# Patient Record
Sex: MALE | Race: OTHER | NOT HISPANIC OR LATINO
[De-identification: names, ages, dates, MRNs, and addresses within clinical notes are randomized per-mention and may not be internally consistent; named-entity substitution may affect disease eponyms.]

---

## 2021-02-04 ENCOUNTER — APPOINTMENT (OUTPATIENT)
Dept: PULMONOLOGY | Facility: CLINIC | Age: 61
End: 2021-02-04
Payer: COMMERCIAL

## 2021-02-04 VITALS
HEIGHT: 70 IN | SYSTOLIC BLOOD PRESSURE: 112 MMHG | WEIGHT: 192 LBS | OXYGEN SATURATION: 95 % | DIASTOLIC BLOOD PRESSURE: 73 MMHG | RESPIRATION RATE: 15 BRPM | HEART RATE: 102 BPM | TEMPERATURE: 98.4 F | BODY MASS INDEX: 27.49 KG/M2

## 2021-02-04 PROCEDURE — 99214 OFFICE O/P EST MOD 30 MIN: CPT

## 2021-02-04 PROCEDURE — 99072 ADDL SUPL MATRL&STAF TM PHE: CPT

## 2021-02-04 RX ORDER — PROMETHAZINE HYDROCHLORIDE AND DEXTROMETHORPHAN HYDROBROMIDE ORAL SOLUTION 15; 6.25 MG/5ML; MG/5ML
6.25-15 SOLUTION ORAL
Qty: 473 | Refills: 0 | Status: ACTIVE | COMMUNITY
Start: 2021-01-14

## 2021-02-04 RX ORDER — DEXAMETHASONE 6 MG/1
6 TABLET ORAL
Qty: 5 | Refills: 0 | Status: ACTIVE | COMMUNITY
Start: 2021-01-21

## 2021-02-04 RX ORDER — BENZONATATE 100 MG/1
100 CAPSULE ORAL
Qty: 60 | Refills: 0 | Status: ACTIVE | COMMUNITY
Start: 2021-01-12

## 2021-02-04 RX ORDER — ROSUVASTATIN CALCIUM 20 MG/1
20 TABLET, FILM COATED ORAL
Qty: 90 | Refills: 0 | Status: ACTIVE | COMMUNITY
Start: 2020-11-22

## 2021-02-04 RX ORDER — FLUTICASONE FUROATE, UMECLIDINIUM BROMIDE AND VILANTEROL TRIFENATATE 100; 62.5; 25 UG/1; UG/1; UG/1
100-62.5-25 POWDER RESPIRATORY (INHALATION) DAILY
Qty: 1 | Refills: 3 | Status: ACTIVE | COMMUNITY
Start: 2021-02-04 | End: 1900-01-01

## 2021-02-04 RX ORDER — MULTIVIT-MIN/FOLIC/VIT K/LYCOP 400-300MCG
25 MCG TABLET ORAL
Qty: 30 | Refills: 0 | Status: ACTIVE | COMMUNITY
Start: 2021-01-21

## 2021-02-04 RX ORDER — MULTIVIT-MIN/FOLIC/VIT K/LYCOP 400-300MCG
500 TABLET ORAL
Qty: 30 | Refills: 0 | Status: ACTIVE | COMMUNITY
Start: 2021-01-21

## 2021-02-04 RX ORDER — DEXAMETHASONE 6 MG/1
6 TABLET ORAL DAILY
Qty: 10 | Refills: 0 | Status: ACTIVE | COMMUNITY
Start: 2021-02-04 | End: 1900-01-01

## 2021-02-04 RX ORDER — METHYLPREDNISOLONE 4 MG/1
4 TABLET ORAL
Qty: 21 | Refills: 0 | Status: ACTIVE | COMMUNITY
Start: 2021-01-14

## 2021-02-04 RX ORDER — MULTIVITAMIN WITH FOLIC ACID 400 MCG
TABLET ORAL
Qty: 30 | Refills: 0 | Status: ACTIVE | COMMUNITY
Start: 2021-01-21

## 2021-02-04 RX ORDER — ZINC SULFATE 50(220)MG
220 (50 ZN) CAPSULE ORAL
Qty: 30 | Refills: 0 | Status: ACTIVE | COMMUNITY
Start: 2021-01-21

## 2021-02-04 RX ORDER — AZITHROMYCIN 250 MG/1
250 TABLET, FILM COATED ORAL
Qty: 6 | Refills: 0 | Status: ACTIVE | COMMUNITY
Start: 2021-01-14

## 2021-02-04 NOTE — ASSESSMENT
[FreeTextEntry1] : In summary the patient is a 60-year-old male with past medical history significant for high cholesterol who was recently discharged from the hospital after a bout of COVID-19 pneumonia.  The patient is still symptomatic and therefore I am restarting him on dexamethasone 6 mg daily.  I have instructed him to continue on Trelegy and to use his incentive spirometry.  I have also instructed the patient to prone as tolerated and to follow-up in 5 days.  A repeat Covid swab was ordered

## 2021-02-04 NOTE — HISTORY OF PRESENT ILLNESS
[Never] : never [TextBox_4] : Patient is a 60-year-old male with past medical history significant for high cholesterol who was recently discharged from the hospital after a bout of COVID-19 pneumonia.  The patient was treated with dexamethasone and Lovenox he presents today for further management.  The patient is complaining of cough shortness of breath and dyspnea on exertion.  He also complains of intermittent fevers

## 2021-02-04 NOTE — REASON FOR VISIT
[Follow-Up - From Hospitalization] : a follow-up visit after a recent hospitalization [Cough] : cough [Shortness of Breath] : shortness of breath [Spouse] : spouse [TextBox_44] : COVID-19 pneumonia

## 2021-02-04 NOTE — REVIEW OF SYSTEMS
[Cough] : cough [Hemoptysis] : no hemoptysis [Chest Tightness] : no chest tightness [Frequent URIs] : no frequent URIs [Sputum] : no sputum [Dyspnea] : dyspnea [Pleuritic Pain] : pleuritic pain [Wheezing] : wheezing [A.M. Dry Mouth] : a.m. dry mouth [SOB on Exertion] : sob on exertion [Negative] : Endocrine

## 2021-02-05 LAB — SARS-COV-2 N GENE NPH QL NAA+PROBE: DETECTED

## 2021-02-09 ENCOUNTER — APPOINTMENT (OUTPATIENT)
Dept: PULMONOLOGY | Facility: CLINIC | Age: 61
End: 2021-02-09
Payer: COMMERCIAL

## 2021-02-09 VITALS
OXYGEN SATURATION: 96 % | SYSTOLIC BLOOD PRESSURE: 113 MMHG | HEIGHT: 70 IN | TEMPERATURE: 98.1 F | HEART RATE: 84 BPM | DIASTOLIC BLOOD PRESSURE: 69 MMHG | RESPIRATION RATE: 14 BRPM | BODY MASS INDEX: 27.49 KG/M2 | WEIGHT: 192 LBS

## 2021-02-09 PROCEDURE — 99214 OFFICE O/P EST MOD 30 MIN: CPT

## 2021-02-09 PROCEDURE — 99072 ADDL SUPL MATRL&STAF TM PHE: CPT

## 2021-02-09 NOTE — REASON FOR VISIT
[Follow-Up] : a follow-up visit [Cough] : cough [Shortness of Breath] : shortness of breath [Wheezing] : wheezing [Spouse] : spouse [TextBox_44] : COVID 19 pneumona

## 2021-02-09 NOTE — ASSESSMENT
[FreeTextEntry1] : In summary the patient is a 60-year-old male with past medical history significant for high cholesterol status post COVID-19 pneumonia who presents for follow-up.  The patient's physical exam is significant for improved air entry bilaterally.  I have instructed the patient not to take his statin therapy for the time being.  The patient does have remnants of COVID-19 pneumonia and therefore I recommended that he continue his current current therapies and follow-up in 2 weeks.  Patient is not medically stable to return to work at this time

## 2021-02-09 NOTE — HISTORY OF PRESENT ILLNESS
[Former] : former [Never] : never [TextBox_4] : Patient is a 60-year-old male with past medical history significant for high cholesterol, history of COVID-19 pneumonia recently treated with prednisone again after discharge from the hospital who presents for follow-up.  The patient's recent nasal swab was positive post discharge.  He is currently on an oxygen concentrator.  He feels better than his last visit although he still complains of fatigue myalgias occasional cough and shortness of breath.  He denies any fevers

## 2021-02-23 ENCOUNTER — APPOINTMENT (OUTPATIENT)
Dept: PULMONOLOGY | Facility: CLINIC | Age: 61
End: 2021-02-23
Payer: COMMERCIAL

## 2021-02-23 VITALS
BODY MASS INDEX: 27.49 KG/M2 | RESPIRATION RATE: 14 BRPM | OXYGEN SATURATION: 94 % | DIASTOLIC BLOOD PRESSURE: 71 MMHG | HEIGHT: 70 IN | HEART RATE: 85 BPM | TEMPERATURE: 98.2 F | SYSTOLIC BLOOD PRESSURE: 112 MMHG | WEIGHT: 192 LBS

## 2021-02-23 PROCEDURE — 99072 ADDL SUPL MATRL&STAF TM PHE: CPT

## 2021-02-23 PROCEDURE — 99214 OFFICE O/P EST MOD 30 MIN: CPT

## 2021-02-24 NOTE — ASSESSMENT
[FreeTextEntry1] : In summary the patient is a 60-year-old male with past medical history significant for hypertension, high cholesterol who was recently discharged from the hospital after being admitted with COVID-19 pneumonia.  The patient's physical exam is significant for improved air entry.  I am concerned that the patient continues to suffer from myalgia and fatigue.  A chest x-ray has been ordered.  I am ordering a repeat Covid swab and the patient is instructed to follow-up in 1 month

## 2021-02-24 NOTE — HISTORY OF PRESENT ILLNESS
[Former] : former [Never] : never [TextBox_4] : Patient is a 60-year-old male with past medical history significant for COVID-19 pneumonia who presents today for follow-up.  The patient was treated with remdesivir and dexamethasone and anticoagulation his most recent PCR was positive and presents today for further management he continues to suffer from fatigue and myalgia.  He does complain of shortness of breath and dyspnea on exertion.  He denies any recent fevers chills chest pain weight loss or hemoptysis

## 2021-02-24 NOTE — REVIEW OF SYSTEMS
[Cough] : cough [Hemoptysis] : no hemoptysis [Frequent URIs] : no frequent URIs [Chest Tightness] : no chest tightness [Sputum] : no sputum [Dyspnea] : dyspnea [Pleuritic Pain] : pleuritic pain [Wheezing] : wheezing [SOB on Exertion] : sob on exertion [A.M. Dry Mouth] : a.m. dry mouth [Negative] : Psychiatric

## 2021-02-26 LAB — SARS-COV-2 N GENE NPH QL NAA+PROBE: NOT DETECTED

## 2021-03-23 ENCOUNTER — APPOINTMENT (OUTPATIENT)
Dept: PULMONOLOGY | Facility: CLINIC | Age: 61
End: 2021-03-23
Payer: COMMERCIAL

## 2021-03-23 VITALS
HEIGHT: 70 IN | RESPIRATION RATE: 14 BRPM | TEMPERATURE: 97.5 F | HEART RATE: 80 BPM | SYSTOLIC BLOOD PRESSURE: 137 MMHG | WEIGHT: 205 LBS | DIASTOLIC BLOOD PRESSURE: 82 MMHG | BODY MASS INDEX: 29.35 KG/M2 | OXYGEN SATURATION: 95 %

## 2021-03-23 DIAGNOSIS — U07.1 COVID-19: ICD-10-CM

## 2021-03-23 DIAGNOSIS — J12.82 COVID-19: ICD-10-CM

## 2021-03-23 DIAGNOSIS — Z00.00 ENCOUNTER FOR GENERAL ADULT MEDICAL EXAMINATION W/OUT ABNORMAL FINDINGS: ICD-10-CM

## 2021-03-23 PROCEDURE — 99072 ADDL SUPL MATRL&STAF TM PHE: CPT

## 2021-03-23 PROCEDURE — 99214 OFFICE O/P EST MOD 30 MIN: CPT

## 2021-03-23 NOTE — ASSESSMENT
[FreeTextEntry1] : In summary the patient is a 60-year-old male with past medical history significant for hypertension, high cholesterol who was recently discharged from the hospital after being admitted with COVID-19 pneumonia.  The patient's physical exam is significant for improved air entry.  I have reassured the patient and his wife that he has improved significantly since his hospitalization.  The patient is now being referred to cardiology and instructed to follow-up in 3 months

## 2021-03-23 NOTE — HISTORY OF PRESENT ILLNESS
[Former] : former [Never] : never [TextBox_4] : Patient is a 60-year-old male with past medical history significant for COVID-19 pneumonia, high cholesterol, hypertension who presents today for further management.  The patient states that his cough and shortness of breath and dyspnea on exertion have improved but not resolved completely.  He denies fevers chills chest pain weight loss or hemoptysis

## 2021-03-25 ENCOUNTER — APPOINTMENT (OUTPATIENT)
Dept: HEART AND VASCULAR | Facility: CLINIC | Age: 61
End: 2021-03-25
Payer: COMMERCIAL

## 2021-03-25 ENCOUNTER — NON-APPOINTMENT (OUTPATIENT)
Age: 61
End: 2021-03-25

## 2021-03-25 VITALS
DIASTOLIC BLOOD PRESSURE: 71 MMHG | OXYGEN SATURATION: 93 % | HEIGHT: 70 IN | RESPIRATION RATE: 15 BRPM | SYSTOLIC BLOOD PRESSURE: 123 MMHG | BODY MASS INDEX: 29.63 KG/M2 | HEART RATE: 77 BPM | TEMPERATURE: 98.1 F | WEIGHT: 207 LBS

## 2021-03-25 PROCEDURE — 99214 OFFICE O/P EST MOD 30 MIN: CPT

## 2021-03-25 PROCEDURE — 99072 ADDL SUPL MATRL&STAF TM PHE: CPT

## 2021-03-25 NOTE — PHYSICAL EXAM
[General Appearance - Well Developed] : well developed [Normal Appearance] : normal appearance [Well Groomed] : well groomed [General Appearance - Well Nourished] : well nourished [No Deformities] : no deformities [General Appearance - In No Acute Distress] : no acute distress [Normal Conjunctiva] : the conjunctiva exhibited no abnormalities [Eyelids - No Xanthelasma] : the eyelids demonstrated no xanthelasmas [Normal Oral Mucosa] : normal oral mucosa [No Oral Pallor] : no oral pallor [No Oral Cyanosis] : no oral cyanosis [Normal Jugular Venous A Waves Present] : normal jugular venous A waves present [Normal Jugular Venous V Waves Present] : normal jugular venous V waves present [No Jugular Venous Hough A Waves] : no jugular venous hough A waves [Heart Rate And Rhythm] : heart rate and rhythm were normal [Heart Sounds] : normal S1 and S2 [Murmurs] : no murmurs present [Respiration, Rhythm And Depth] : normal respiratory rhythm and effort [Exaggerated Use Of Accessory Muscles For Inspiration] : no accessory muscle use [Auscultation Breath Sounds / Voice Sounds] : lungs were clear to auscultation bilaterally [Abdomen Soft] : soft [Abdomen Tenderness] : non-tender [Abdomen Mass (___ Cm)] : no abdominal mass palpated [Nail Clubbing] : no clubbing of the fingernails [Cyanosis, Localized] : no localized cyanosis [Petechial Hemorrhages (___cm)] : no petechial hemorrhages [] : no ischemic changes

## 2021-03-30 NOTE — ASSESSMENT
[FreeTextEntry1] : 60-year-old man with past medical history of hyperlipidemia, hypertension, here for first evaluation with his provider.\par \par \par CHEST PAIN \par -in the setting of risk factors, symptoms and abn EKG will r/o ischemia with nuclear stress test\par -echo to r/o any WMA\par -aspirin 81mg daily \par -recc to restart rosuvastatin 20 mg daily (obtain documentation from PMD including recent labs)\par \par HTN\par -well controlled off meds\par \par HLD\par -obtain fasting lipid panel from PMD \par -recc to restart rosuvastatin 20 mg daily \par \par \par f/u in 2 weeks for echo and nuc stress test results \par \par \par ****ADDENDUM 3/30/2021*****\par Nuclear  stress test in 3/26/2021\par There is a medium-sized reversible perfusion defect of moderate intensity involving the mid apical inferior and inferolateral walls. In addition there is a small sized reversible perfusion defect of mild to moderate intensity involving the basal-mid anterior anteroseptal myocardial wall\par stress EKG is abnormal \par normal LV function \par \par A/P\par -In the setting of normal nuclear stress test, the patient will be scheduled for cardiac catheterization on Friday, April 9 at [facility].\par Covid-19  testing as per hospital protocol on Wednesday, April 7 in [place]\par

## 2021-04-07 ENCOUNTER — APPOINTMENT (OUTPATIENT)
Dept: HEART AND VASCULAR | Facility: CLINIC | Age: 61
End: 2021-04-07
Payer: COMMERCIAL

## 2021-04-07 PROCEDURE — ZZZZZ: CPT

## 2021-04-08 VITALS
OXYGEN SATURATION: 97 % | HEART RATE: 85 BPM | RESPIRATION RATE: 16 BRPM | HEIGHT: 62 IN | SYSTOLIC BLOOD PRESSURE: 175 MMHG | TEMPERATURE: 98 F | DIASTOLIC BLOOD PRESSURE: 82 MMHG | WEIGHT: 158.95 LBS

## 2021-04-08 RX ORDER — CHLORHEXIDINE GLUCONATE 213 G/1000ML
1 SOLUTION TOPICAL ONCE
Refills: 0 | Status: DISCONTINUED | OUTPATIENT
Start: 2021-04-09 | End: 2021-04-23

## 2021-04-08 NOTE — H&P ADULT - NSICDXFAMILYHX_GEN_ALL_CORE_FT
No pertinent family history in first degree relatives FAMILY HISTORY:  Uncle  Still living? Unknown  Family history of CVA, Age at diagnosis: Age Unknown

## 2021-04-08 NOTE — H&P ADULT - HISTORY OF PRESENT ILLNESS
Cardiologist: Dr Romero  Covid negative on 4/7/21 in [facility]  Escort: Wife  Pharmacy:    Pt will bring in meds (states only on cholesterol medication which is Rosuvastatin 20mg QHS per chart)  Dr Romero aware of intermittent episodes of BRBPR and will c/w case pending H/H on arrival. Discuss load in AM    61 y/o Male, former smoker, with FamHx of CVA (maternal uncle) and PMHx of HLD and recent Covid (hospitalized for 6 days in mid January 2021), who presented to Dr. Romero for c/o CP and MYRICK which began around the time of his Covid diagnosis. He states he becomes SOB after walking 1-2 blocks and has some epigastric pain which radiates to the center of his chest. He denies any aggravating or relieving factors. He additionally c/o 2 pillow orthopnea and finds he breaths  Pt also reports intermittent episodes of BRBPR. Pt states he was supposed to have colonoscopy done, but was unable to have it done as it was scheduled around the time of Covid diagnosis. Pt attributes these episodes to hemorrhoids, but has never been formally diagnosed by MD. Cardiologist: Dr Romero  Covid negative on 4/7/21 in [facility]  Escort: Wife  Pharmacy:    Pt will bring in meds (states only on cholesterol medication which is Rosuvastatin 20mg QHS per chart)  Dr Romero aware of intermittent episodes of BRBPR and will c/w case pending H/H on arrival. Discuss load in AM    59 y/o Male, former smoker, with FamHx of CVA (maternal uncle) and PMHx of HLD and recent Covid (hospitalized for 6 days in mid January 2021), who presented to Dr. Romero for c/o CP and MYRICK which began around the time of his Covid diagnosis. He states he becomes SOB after walking 1-2 blocks and has some epigastric pain which radiates to the center of his chest. He denies any aggravating or relieving factors. He additionally c/o 2 pillow orthopnea and finds he breaths  Pt also reports intermittent episodes of BRBPR. Pt states he was supposed to have colonoscopy done, but was unable to have it done as it was scheduled around the time of Covid diagnosis. Pt attributes these episodes to hemorrhoids, but has never been formally diagnosed by MD.  EKG on 3/25/21 per MD note revealed SR, diffuse TWI. NST on 3/26/21 revealed medium sized reversible perfusion defect of moderate intensity involving the mid apical inferior and inferolateral walls as well as a small sized reversible perfusion defect of mild-moderate intensity involving the basal-mid anterior anteroseptal wall, normal LV function.   In light of pt's risk factors, CCS class III symptoms, and abnormal NST, pt now presents to [facility] for recommended cardiac cath with possible intervention if clinically indicated.  Cardiologist: Dr Romero  Covid negative on 4/7/21 in [facility]  Escort: Wife  Pharmacy:    Dr Romero aware of intermittent episodes of BRBPR and will c/w case pending H/H on arrival.     61 y/o Male, former smoker, with FamHx of CVA (maternal uncle) and PMHx of HLD and recent Covid (hospitalized for 6 days in mid January 2021), who presented to Dr. Romero for c/o CP and MYRICK which began around the time of his Covid diagnosis. He states he becomes SOB after walking 1-2 blocks and has some epigastric pain which radiates to the center of his chest. He denies any aggravating or relieving factors. He additionally c/o 2 pillow orthopnea.  Pt also reports intermittent episodes of BRBPR, last episode a few months ago. Pt states he was supposed to have colonoscopy done, but was unable to have it done as it was scheduled around the time of Covid diagnosis. Pt attributes these episodes to hemorrhoids, but has never been formally diagnosed by MD.  EKG on 3/25/21 per MD note revealed SR, diffuse TWI. NST on 3/26/21 revealed medium sized reversible perfusion defect of moderate intensity involving the mid apical inferior and inferolateral walls as well as a small sized reversible perfusion defect of mild-moderate intensity involving the basal-mid anterior anteroseptal wall, normal LV function.   In light of pt's risk factors, CCS class III symptoms, and abnormal NST, pt now presents to [facility] for recommended cardiac cath with possible intervention if clinically indicated.

## 2021-04-08 NOTE — H&P ADULT - RS GEN PE MLT RESP DETAILS PC
respirations non-labored/clear to auscultation bilaterally/no intercostal retractions/no rales/no rhonchi/no wheezes

## 2021-04-08 NOTE — H&P ADULT - NSHPLABSRESULTS_GEN_ALL_CORE
EC.3   8.13  )-----------( 156      ( 2021 07:28 )             43.8       04-    144  |  106  |  15  ----------------------------<  106<H>  4.2   |  26  |  1.15    Ca    9.8      2021 07:28    TPro  7.6  /  Alb  4.6  /  TBili  0.5  /  DBili  x   /  AST  23  /  ALT  21  /  AlkPhos  96  04-09      PT/INR - ( 2021 07:28 )   PT: 12.5 sec;   INR: 1.04          PTT - ( 2021 07:28 )  PTT:29.9 sec    CARDIAC MARKERS ( 2021 07:28 )  x     / x     / 206 U/L / x     / 2.7 ng/mL ECG: NSR @ 73bpm, TWI in V3-V6, III, aVF                        14.3   8.13  )-----------( 156      ( 09 Apr 2021 07:28 )             43.8       04-09    144  |  106  |  15  ----------------------------<  106<H>  4.2   |  26  |  1.15    Ca    9.8      09 Apr 2021 07:28    TPro  7.6  /  Alb  4.6  /  TBili  0.5  /  DBili  x   /  AST  23  /  ALT  21  /  AlkPhos  96  04-09      PT/INR - ( 09 Apr 2021 07:28 )   PT: 12.5 sec;   INR: 1.04          PTT - ( 09 Apr 2021 07:28 )  PTT:29.9 sec    CARDIAC MARKERS ( 09 Apr 2021 07:28 )  x     / x     / 206 U/L / x     / 2.7 ng/mL

## 2021-04-09 ENCOUNTER — OUTPATIENT (OUTPATIENT)
Dept: OUTPATIENT SERVICES | Facility: HOSPITAL | Age: 61
LOS: 1 days | Discharge: HOME | End: 2021-04-09
Payer: COMMERCIAL

## 2021-04-09 DIAGNOSIS — Z98.890 OTHER SPECIFIED POSTPROCEDURAL STATES: Chronic | ICD-10-CM

## 2021-04-09 LAB
A1C WITH ESTIMATED AVERAGE GLUCOSE RESULT: 5.4 % — SIGNIFICANT CHANGE UP (ref 4–5.6)
ALBUMIN SERPL ELPH-MCNC: 4.6 G/DL — SIGNIFICANT CHANGE UP (ref 3.3–5)
ALP SERPL-CCNC: 96 U/L — SIGNIFICANT CHANGE UP (ref 40–120)
ALT FLD-CCNC: 21 U/L — SIGNIFICANT CHANGE UP (ref 10–45)
ANION GAP SERPL CALC-SCNC: 12 MMOL/L — SIGNIFICANT CHANGE UP (ref 5–17)
APTT BLD: 29.9 SEC — SIGNIFICANT CHANGE UP (ref 27.5–35.5)
AST SERPL-CCNC: 23 U/L — SIGNIFICANT CHANGE UP (ref 10–40)
BASOPHILS # BLD AUTO: 0.05 K/UL — SIGNIFICANT CHANGE UP (ref 0–0.2)
BASOPHILS NFR BLD AUTO: 0.6 % — SIGNIFICANT CHANGE UP (ref 0–2)
BILIRUB SERPL-MCNC: 0.5 MG/DL — SIGNIFICANT CHANGE UP (ref 0.2–1.2)
BUN SERPL-MCNC: 15 MG/DL — SIGNIFICANT CHANGE UP (ref 7–23)
CALCIUM SERPL-MCNC: 9.8 MG/DL — SIGNIFICANT CHANGE UP (ref 8.4–10.5)
CHLORIDE SERPL-SCNC: 106 MMOL/L — SIGNIFICANT CHANGE UP (ref 96–108)
CHOLEST SERPL-MCNC: 149 MG/DL — SIGNIFICANT CHANGE UP
CK MB CFR SERPL CALC: 2.7 NG/ML — SIGNIFICANT CHANGE UP (ref 0–6.7)
CK SERPL-CCNC: 206 U/L — HIGH (ref 30–200)
CO2 SERPL-SCNC: 26 MMOL/L — SIGNIFICANT CHANGE UP (ref 22–31)
CREAT SERPL-MCNC: 1.15 MG/DL — SIGNIFICANT CHANGE UP (ref 0.5–1.3)
EOSINOPHIL # BLD AUTO: 0.13 K/UL — SIGNIFICANT CHANGE UP (ref 0–0.5)
EOSINOPHIL NFR BLD AUTO: 1.6 % — SIGNIFICANT CHANGE UP (ref 0–6)
ESTIMATED AVERAGE GLUCOSE: 108 MG/DL — SIGNIFICANT CHANGE UP (ref 68–114)
GLUCOSE SERPL-MCNC: 106 MG/DL — HIGH (ref 70–99)
HCT VFR BLD CALC: 43.8 % — SIGNIFICANT CHANGE UP (ref 39–50)
HDLC SERPL-MCNC: 39 MG/DL — LOW
HGB BLD-MCNC: 14.3 G/DL — SIGNIFICANT CHANGE UP (ref 13–17)
IMM GRANULOCYTES NFR BLD AUTO: 0.4 % — SIGNIFICANT CHANGE UP (ref 0–1.5)
INR BLD: 1.04 — SIGNIFICANT CHANGE UP (ref 0.88–1.16)
LIPID PNL WITH DIRECT LDL SERPL: 73 MG/DL — SIGNIFICANT CHANGE UP
LYMPHOCYTES # BLD AUTO: 3.12 K/UL — SIGNIFICANT CHANGE UP (ref 1–3.3)
LYMPHOCYTES # BLD AUTO: 38.4 % — SIGNIFICANT CHANGE UP (ref 13–44)
MCHC RBC-ENTMCNC: 29.5 PG — SIGNIFICANT CHANGE UP (ref 27–34)
MCHC RBC-ENTMCNC: 32.6 GM/DL — SIGNIFICANT CHANGE UP (ref 32–36)
MCV RBC AUTO: 90.3 FL — SIGNIFICANT CHANGE UP (ref 80–100)
MONOCYTES # BLD AUTO: 0.73 K/UL — SIGNIFICANT CHANGE UP (ref 0–0.9)
MONOCYTES NFR BLD AUTO: 9 % — SIGNIFICANT CHANGE UP (ref 2–14)
NEUTROPHILS # BLD AUTO: 4.07 K/UL — SIGNIFICANT CHANGE UP (ref 1.8–7.4)
NEUTROPHILS NFR BLD AUTO: 50 % — SIGNIFICANT CHANGE UP (ref 43–77)
NON HDL CHOLESTEROL: 110 MG/DL — SIGNIFICANT CHANGE UP
NRBC # BLD: 0 /100 WBCS — SIGNIFICANT CHANGE UP (ref 0–0)
PLATELET # BLD AUTO: 156 K/UL — SIGNIFICANT CHANGE UP (ref 150–400)
POTASSIUM SERPL-MCNC: 4.2 MMOL/L — SIGNIFICANT CHANGE UP (ref 3.5–5.3)
POTASSIUM SERPL-SCNC: 4.2 MMOL/L — SIGNIFICANT CHANGE UP (ref 3.5–5.3)
PROT SERPL-MCNC: 7.6 G/DL — SIGNIFICANT CHANGE UP (ref 6–8.3)
PROTHROM AB SERPL-ACNC: 12.5 SEC — SIGNIFICANT CHANGE UP (ref 10.6–13.6)
RBC # BLD: 4.85 M/UL — SIGNIFICANT CHANGE UP (ref 4.2–5.8)
RBC # FLD: 13.7 % — SIGNIFICANT CHANGE UP (ref 10.3–14.5)
SODIUM SERPL-SCNC: 144 MMOL/L — SIGNIFICANT CHANGE UP (ref 135–145)
TRIGL SERPL-MCNC: 184 MG/DL — HIGH
WBC # BLD: 8.13 K/UL — SIGNIFICANT CHANGE UP (ref 3.8–10.5)
WBC # FLD AUTO: 8.13 K/UL — SIGNIFICANT CHANGE UP (ref 3.8–10.5)

## 2021-04-09 PROCEDURE — 99152 MOD SED SAME PHYS/QHP 5/>YRS: CPT | Mod: 59

## 2021-04-09 PROCEDURE — 93010 ELECTROCARDIOGRAM REPORT: CPT

## 2021-04-09 PROCEDURE — 99153 MOD SED SAME PHYS/QHP EA: CPT

## 2021-04-09 PROCEDURE — 99152 MOD SED SAME PHYS/QHP 5/>YRS: CPT

## 2021-04-09 PROCEDURE — C1887: CPT

## 2021-04-09 PROCEDURE — C1894: CPT

## 2021-04-09 PROCEDURE — 80053 COMPREHEN METABOLIC PANEL: CPT

## 2021-04-09 PROCEDURE — 80061 LIPID PANEL: CPT

## 2021-04-09 PROCEDURE — 93458 L HRT ARTERY/VENTRICLE ANGIO: CPT

## 2021-04-09 PROCEDURE — 36415 COLL VENOUS BLD VENIPUNCTURE: CPT

## 2021-04-09 PROCEDURE — 93005 ELECTROCARDIOGRAM TRACING: CPT

## 2021-04-09 PROCEDURE — 93458 L HRT ARTERY/VENTRICLE ANGIO: CPT | Mod: 26

## 2021-04-09 PROCEDURE — 85730 THROMBOPLASTIN TIME PARTIAL: CPT

## 2021-04-09 PROCEDURE — 83036 HEMOGLOBIN GLYCOSYLATED A1C: CPT

## 2021-04-09 PROCEDURE — 85610 PROTHROMBIN TIME: CPT

## 2021-04-09 PROCEDURE — 85025 COMPLETE CBC W/AUTO DIFF WBC: CPT

## 2021-04-09 PROCEDURE — C1769: CPT

## 2021-04-09 PROCEDURE — 82550 ASSAY OF CK (CPK): CPT

## 2021-04-09 PROCEDURE — 82553 CREATINE MB FRACTION: CPT

## 2021-04-09 RX ORDER — SODIUM CHLORIDE 9 MG/ML
500 INJECTION INTRAMUSCULAR; INTRAVENOUS; SUBCUTANEOUS
Refills: 0 | Status: DISCONTINUED | OUTPATIENT
Start: 2021-04-09 | End: 2021-04-23

## 2021-04-09 RX ORDER — SODIUM CHLORIDE 9 MG/ML
500 INJECTION INTRAMUSCULAR; INTRAVENOUS; SUBCUTANEOUS
Refills: 0 | Status: DISCONTINUED | OUTPATIENT
Start: 2021-04-09 | End: 2021-04-09

## 2021-04-09 RX ORDER — ROSUVASTATIN CALCIUM 5 MG/1
1 TABLET ORAL
Qty: 0 | Refills: 0 | DISCHARGE

## 2021-04-09 RX ORDER — CLOPIDOGREL BISULFATE 75 MG/1
600 TABLET, FILM COATED ORAL ONCE
Refills: 0 | Status: COMPLETED | OUTPATIENT
Start: 2021-04-09 | End: 2021-04-09

## 2021-04-09 RX ORDER — ASPIRIN/CALCIUM CARB/MAGNESIUM 324 MG
325 TABLET ORAL ONCE
Refills: 0 | Status: COMPLETED | OUTPATIENT
Start: 2021-04-09 | End: 2021-04-09

## 2021-04-09 RX ADMIN — Medication 325 MILLIGRAM(S): at 08:18

## 2021-04-09 RX ADMIN — SODIUM CHLORIDE 100 MILLILITER(S): 9 INJECTION INTRAMUSCULAR; INTRAVENOUS; SUBCUTANEOUS at 08:02

## 2021-04-09 RX ADMIN — CLOPIDOGREL BISULFATE 600 MILLIGRAM(S): 75 TABLET, FILM COATED ORAL at 08:18

## 2021-04-09 NOTE — PROGRESS NOTE ADULT - SUBJECTIVE AND OBJECTIVE BOX
PROCEDURE: CORONARY ANGIOGRAM, LHC, LVGRAM  INDICATION: UNSTABLE ANGINA, POSITIVE STRESS TEST   ATTENDING: DR. CRISTOFER ROMERO MD   ACCESS: RRA    FINDINGS   RCA= normal  LM= normal   LAD= luminal irregularities  Lcx= luminal irregularities  OM1=large vessel, normal    LVEF: 65%  LVEDP: mmHg     A/P  -normal coronaries   -please schedule outpatient follow up with Dr. Romero in a week in [facility] ( tel: [phone])

## 2021-04-09 NOTE — PROGRESS NOTE ADULT - SUBJECTIVE AND OBJECTIVE BOX
Interventional Cardiology PA SDA Discharge Note    Patient without complaints. Ambulated and voided without difficulties  Afebrile, VSS  Ext: Right  Radial : no hematoma,  no bleeding, dressing; C/D/  Pulses:    intact RAD to baseline     A/P:    61 y/o Male, former smoker, with FamHx of CVA (maternal uncle) and PMHx of HLD and recent Covid (hospitalized for 6 days in mid January 2021), who presented to Dr. Romero for c/o CP and MYRICK which began around the time of his Covid diagnosis. He states he becomes SOB after walking 1-2 blocks and has some epigastric pain which radiates to the center of his chest. He denies any aggravating or relieving factors. He additionally c/o 2 pillow orthopnea.  Pt also reports intermittent episodes of BRBPR, last episode a few months ago. Pt states he was supposed to have colonoscopy done, but was unable to have it done as it was scheduled around the time of Covid diagnosis. Pt attributes these episodes to hemorrhoids, but has never been formally diagnosed by MD.  EKG on 3/25/21 per MD note revealed SR, diffuse TWI. NST on 3/26/21 revealed medium sized reversible perfusion defect of moderate intensity involving the mid apical inferior and inferolateral walls as well as a small sized reversible perfusion defect of mild-moderate intensity involving the basal-mid anterior anteroseptal wall, normal LV function.   In light of pt's risk factors, CCS class III symptoms, and abnormal NST, pt now presents to [facility] for recommended cardiac cath with possible intervention if clinically indicated.   Pt is now s/p cardiac cath (4/9/21) revealing __________________. Pt to follow up with Dr. Romero on 4/13/2021 at 4:00pm.      1.	Stable for discharge as per attending Dr. Romero after bed rest, pt voids, groin/wrist stable and 30 minutes of ambulation.  2.	Follow-up with Cardiologist Dr. Romero on 4/13/2021 at 4:00pm.  3.	Discharged forms signed and copies in chart    Interventional Cardiology PA SDA Discharge Note    Patient without complaints. Ambulated and voided without difficulties  Afebrile, VSS  Ext: Right  Radial : no hematoma,  no bleeding, dressing; C/D/  Pulses:    intact RAD to baseline     A/P:    61 y/o Male, former smoker, with FamHx of CVA (maternal uncle) and PMHx of HLD and recent Covid (hospitalized for 6 days in mid January 2021), who presented to Dr. Romero for c/o CP and MYRICK which began around the time of his Covid diagnosis. He states he becomes SOB after walking 1-2 blocks and has some epigastric pain which radiates to the center of his chest. He denies any aggravating or relieving factors. He additionally c/o 2 pillow orthopnea.  Pt also reports intermittent episodes of BRBPR, last episode a few months ago. Pt states he was supposed to have colonoscopy done, but was unable to have it done as it was scheduled around the time of Covid diagnosis. Pt attributes these episodes to hemorrhoids, but has never been formally diagnosed by MD.  EKG on 3/25/21 per MD note revealed SR, diffuse TWI. NST on 3/26/21 revealed medium sized reversible perfusion defect of moderate intensity involving the mid apical inferior and inferolateral walls as well as a small sized reversible perfusion defect of mild-moderate intensity involving the basal-mid anterior anteroseptal wall, normal LV function.   In light of pt's risk factors, CCS class III symptoms, and abnormal NST, pt now presents to [facility] for recommended cardiac cath with possible intervention if clinically indicated.   Pt is now s/p cardiac cath (4/9/21) revealing normal coronaries. Pt to follow up with Dr. Romero on 4/13/2021 at 4:00pm.    FINDINGS   RCA= normal  LM= normal   LAD= luminal irregularities  Lcx= luminal irregularities  OM1=large vessel, normal    LVEF: 65%  LVEDP: mmHg    1.	Stable for discharge as per attending Dr. Romero after bed rest, pt voids, groin/wrist stable and 30 minutes of ambulation.  2.	Follow-up with Cardiologist Dr. Romero on 4/13/2021 at 4:00pm.  3.	Discharged forms signed and copies in chart

## 2021-04-10 PROBLEM — E78.5 HYPERLIPIDEMIA, UNSPECIFIED: Chronic | Status: ACTIVE | Noted: 2021-04-08

## 2021-04-10 PROBLEM — U07.1 COVID-19: Chronic | Status: ACTIVE | Noted: 2021-04-08

## 2021-04-12 DIAGNOSIS — I20.0 UNSTABLE ANGINA: ICD-10-CM

## 2021-04-12 DIAGNOSIS — R94.39 ABNORMAL RESULT OF OTHER CARDIOVASCULAR FUNCTION STUDY: ICD-10-CM

## 2021-04-12 DIAGNOSIS — E78.5 HYPERLIPIDEMIA, UNSPECIFIED: ICD-10-CM

## 2021-04-12 DIAGNOSIS — I10 ESSENTIAL (PRIMARY) HYPERTENSION: ICD-10-CM

## 2021-04-12 DIAGNOSIS — E11.9 TYPE 2 DIABETES MELLITUS WITHOUT COMPLICATIONS: ICD-10-CM

## 2021-04-13 ENCOUNTER — APPOINTMENT (OUTPATIENT)
Dept: HEART AND VASCULAR | Facility: CLINIC | Age: 61
End: 2021-04-13
Payer: COMMERCIAL

## 2021-04-13 VITALS
HEART RATE: 77 BPM | OXYGEN SATURATION: 97 % | WEIGHT: 211 LBS | RESPIRATION RATE: 14 BRPM | HEIGHT: 70 IN | DIASTOLIC BLOOD PRESSURE: 81 MMHG | SYSTOLIC BLOOD PRESSURE: 125 MMHG | TEMPERATURE: 97.4 F | BODY MASS INDEX: 30.21 KG/M2

## 2021-04-13 PROCEDURE — 99214 OFFICE O/P EST MOD 30 MIN: CPT

## 2021-04-13 PROCEDURE — 99072 ADDL SUPL MATRL&STAF TM PHE: CPT

## 2021-04-13 NOTE — HISTORY OF PRESENT ILLNESS
[FreeTextEntry1] : PMD; Dr. Lombardi\par \par 60-year-old man with past medical history of hyperlipidemia,  here for first evaluation with his provider.\par The patient was recently diagnosed with COVID- 19 pneumonia in 1/15/2021\par The patient follows up with Dr. Lombardi  (PMD on [facility])\par The patient reports MYRICK and episodes of chest pain since his diagnosis of COVID-19. . 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 Substernal, non radiating. Pleuritic \par Denies syncope, presyncope, LE edema, orthopnea. \par \par PMH\par HLD\par \par PSH\par hernia \par \par ALL\par NKDA\par \par MEDS\par rosuvastatin 20 mg daily  (not taking for two months, self d/janelle )\par \par FH\par no significant cardiac history\par \par SH\par no smoke, no etoh, no drugs\par \par \par Nuclear stress test in 3/26/2021\par There is a medium-sized reversible perfusion defect of moderate intensity involving the mid apical inferior and inferolateral walls. In addition there is a small sized reversible perfusion defect of mild to moderate intensity involving the basal-mid anterior anteroseptal myocardial wall\par stress EKG is abnormal \par normal LV function \par \par \par EKG 3/25/2021\par SR, diffuse TWI \par \par CATH 4/9/2021\par RCA= normal\par LM= normal \par LAD= luminal irregularities\par Lcx= luminal irregularities\par OM1=large vessel, normal\par LVEF: 65%\par \par \par \par

## 2021-04-13 NOTE — ASSESSMENT
[FreeTextEntry1] : 60-year-old man with past medical history of hyperlipidemia, hypertension, here for first evaluation with his provider.\par \par \par CHEST PAIN \par -minimal luminal irregularities on cath 4/2021\par -normal LVEF on LV gram and nuc \par -no echo yet\par -aspirin 81mg daily \par -cont  rosuvastatin 20 mg daily \par -recc to f/u with PMD for non cardiac etiologies of chest pain\par \par HTN\par -well controlled off meds\par -recc healthy diet and exercise\par \par HLD\par -LDL 73 on 4/9/2021\par -cont rosuvastatin 20 mg daily \par \par \par f/u in 4 months or sooner if any symptoms \par \par \par

## 2021-05-05 ENCOUNTER — APPOINTMENT (OUTPATIENT)
Dept: DISASTER EMERGENCY | Facility: OTHER | Age: 61
End: 2021-05-05
Payer: COMMERCIAL

## 2021-05-05 PROCEDURE — 0001A: CPT

## 2021-05-11 ENCOUNTER — APPOINTMENT (OUTPATIENT)
Dept: PULMONOLOGY | Facility: CLINIC | Age: 61
End: 2021-05-11
Payer: COMMERCIAL

## 2021-05-11 VITALS
TEMPERATURE: 97.3 F | BODY MASS INDEX: 30.06 KG/M2 | HEIGHT: 70 IN | WEIGHT: 210 LBS | RESPIRATION RATE: 14 BRPM | SYSTOLIC BLOOD PRESSURE: 119 MMHG | HEART RATE: 72 BPM | DIASTOLIC BLOOD PRESSURE: 71 MMHG | OXYGEN SATURATION: 97 %

## 2021-05-11 PROCEDURE — 94729 DIFFUSING CAPACITY: CPT

## 2021-05-11 PROCEDURE — 95012 NITRIC OXIDE EXP GAS DETER: CPT

## 2021-05-11 PROCEDURE — 99214 OFFICE O/P EST MOD 30 MIN: CPT | Mod: 25

## 2021-05-11 PROCEDURE — XXXXX: CPT

## 2021-05-11 PROCEDURE — 99072 ADDL SUPL MATRL&STAF TM PHE: CPT

## 2021-05-11 PROCEDURE — 94060 EVALUATION OF WHEEZING: CPT

## 2021-05-11 PROCEDURE — 94726 PLETHYSMOGRAPHY LUNG VOLUMES: CPT

## 2021-05-11 NOTE — ASSESSMENT
[FreeTextEntry1] : In summary the patient is a 60-year-old male with past medical history significant for high cholesterol status post COVID-19 pneumonia who presents today for follow-up.  The patient complains of persistent shortness of breath and dyspnea on exertion.  I reviewed the patient's negative cath report with him.  His physical exam is within normal limits.  The patient underwent a pulmonary function test which revealed normal lung volumes.\par FeNO 13 PPB\par Echo ordered to rule out pericardial effusion\par Repeat COVID swab ordered\par Patient instructed to continue current therapy and follow up in 3 months

## 2021-05-11 NOTE — HISTORY OF PRESENT ILLNESS
[TextBox_4] : Patient is a 60-year-old male with past medical history significant for high cholesterol who is status post COVID-19 pneumonia and has had a prolonged recovery who presents today for follow-up.  The patient had a recent positive nuclear stress but his cardiac catheterization was negative.  The patient's recent Covid swab was negative but he states that he was exposed to somebody who just became positive again.  He complains of shortness of breath dyspnea on exertion especially when he leans over to  packages.  The patient works as a doorman

## 2021-05-11 NOTE — REASON FOR VISIT
[Follow-Up] : a follow-up visit [Cough] : cough [Shortness of Breath] : shortness of breath [Spouse] : spouse [TextBox_44] : COVID-19 pneumonia

## 2021-05-12 LAB — SARS-COV-2 N GENE NPH QL NAA+PROBE: NOT DETECTED

## 2021-05-26 ENCOUNTER — APPOINTMENT (OUTPATIENT)
Dept: DISASTER EMERGENCY | Facility: OTHER | Age: 61
End: 2021-05-26
Payer: COMMERCIAL

## 2021-05-26 PROCEDURE — 0002A: CPT

## 2021-06-24 ENCOUNTER — NON-APPOINTMENT (OUTPATIENT)
Age: 61
End: 2021-06-24

## 2021-06-24 LAB — SARS-COV-2 N GENE NPH QL NAA+PROBE: NOT DETECTED

## 2021-09-21 ENCOUNTER — APPOINTMENT (OUTPATIENT)
Dept: HEART AND VASCULAR | Facility: CLINIC | Age: 61
End: 2021-09-21

## 2021-10-05 ENCOUNTER — APPOINTMENT (OUTPATIENT)
Dept: PULMONOLOGY | Facility: CLINIC | Age: 61
End: 2021-10-05
Payer: COMMERCIAL

## 2021-10-05 VITALS
DIASTOLIC BLOOD PRESSURE: 73 MMHG | WEIGHT: 200 LBS | BODY MASS INDEX: 28.63 KG/M2 | HEART RATE: 57 BPM | RESPIRATION RATE: 14 BRPM | OXYGEN SATURATION: 98 % | HEIGHT: 70 IN | TEMPERATURE: 98.4 F | SYSTOLIC BLOOD PRESSURE: 118 MMHG

## 2021-10-05 DIAGNOSIS — R06.02 SHORTNESS OF BREATH: ICD-10-CM

## 2021-10-05 DIAGNOSIS — E78.00 PURE HYPERCHOLESTEROLEMIA, UNSPECIFIED: ICD-10-CM

## 2021-10-05 DIAGNOSIS — Z78.9 OTHER SPECIFIED HEALTH STATUS: ICD-10-CM

## 2021-10-05 DIAGNOSIS — U09.9 POST COVID-19 CONDITION, UNSPECIFIED: ICD-10-CM

## 2021-10-05 PROCEDURE — 99214 OFFICE O/P EST MOD 30 MIN: CPT

## 2021-10-05 RX ORDER — ALBUTEROL SULFATE 90 UG/1
108 (90 BASE) INHALANT RESPIRATORY (INHALATION) EVERY 6 HOURS
Qty: 1 | Refills: 6 | Status: ACTIVE | COMMUNITY
Start: 2021-10-05 | End: 1900-01-01

## 2021-10-06 PROBLEM — Z78.9 SOCIAL ALCOHOL USE: Status: ACTIVE | Noted: 2021-02-04

## 2021-10-06 PROBLEM — Z78.9 NON-SMOKER: Status: ACTIVE | Noted: 2021-02-04

## 2021-10-06 PROBLEM — E78.00 HIGH CHOLESTEROL: Status: ACTIVE | Noted: 2021-02-04

## 2021-10-06 PROBLEM — U07.1 PNEUMONIA DUE TO COVID-19 VIRUS: Status: ACTIVE | Noted: 2021-02-04

## 2021-10-06 NOTE — REASON FOR VISIT
[Follow-Up] : a follow-up visit [Cough] : cough [Shortness of Breath] : shortness of breath [TextBox_44] : Post COVID-19 pneumonia

## 2021-10-06 NOTE — ASSESSMENT
[FreeTextEntry1] : Summary the patient is a 61-year-old male with hypertension, anxiety who is status post COVID-19 pneumonia who presents today for follow-up.  The patient's physical exam is significant for improved air entry bilaterally.  Patient is instructed to use his rescue inhaler on a as needed basis and to follow-up in 3 months.  Prescription renewal performed

## 2021-10-06 NOTE — REVIEW OF SYSTEMS
[Cough] : cough [Hemoptysis] : no hemoptysis [Chest Tightness] : no chest tightness [Frequent URIs] : no frequent URIs [Sputum] : no sputum [Dyspnea] : dyspnea [Pleuritic Pain] : no pleuritic pain [Wheezing] : no wheezing [A.M. Dry Mouth] : a.m. dry mouth [SOB on Exertion] : sob on exertion [Negative] : Endocrine

## 2021-10-06 NOTE — HISTORY OF PRESENT ILLNESS
[Former] : former [Never] : never [TextBox_4] : Patient is a 61-year-old male with past medical history significant for high cholesterol status post hospitalization for COVID-19 pneumonia who presents today for follow-up.  Patient has improved significantly since his discharge from the hospital.  He currently states that he occasionally suffers from cough and shortness of breath but not as before.  He denies fevers chills chest pain weight loss or hemoptysis

## 2021-10-19 ENCOUNTER — NON-APPOINTMENT (OUTPATIENT)
Age: 61
End: 2021-10-19

## 2021-10-19 ENCOUNTER — APPOINTMENT (OUTPATIENT)
Dept: HEART AND VASCULAR | Facility: CLINIC | Age: 61
End: 2021-10-19
Payer: COMMERCIAL

## 2021-10-19 VITALS
DIASTOLIC BLOOD PRESSURE: 71 MMHG | TEMPERATURE: 97.9 F | BODY MASS INDEX: 28.35 KG/M2 | HEART RATE: 54 BPM | RESPIRATION RATE: 15 BRPM | SYSTOLIC BLOOD PRESSURE: 112 MMHG | OXYGEN SATURATION: 97 % | HEIGHT: 70 IN | WEIGHT: 198 LBS

## 2021-10-19 PROCEDURE — 99214 OFFICE O/P EST MOD 30 MIN: CPT

## 2021-10-19 NOTE — PHYSICAL EXAM
[General Appearance - Well Developed] : well developed [Normal Appearance] : normal appearance [Well Groomed] : well groomed [General Appearance - Well Nourished] : well nourished [No Deformities] : no deformities [General Appearance - In No Acute Distress] : no acute distress [Eyelids - No Xanthelasma] : the eyelids demonstrated no xanthelasmas [Normal Oral Mucosa] : normal oral mucosa [No Oral Pallor] : no oral pallor [No Oral Cyanosis] : no oral cyanosis [Normal Jugular Venous A Waves Present] : normal jugular venous A waves present [Normal Jugular Venous V Waves Present] : normal jugular venous V waves present [No Jugular Venous Hough A Waves] : no jugular venous hough A waves [Heart Rate And Rhythm] : heart rate and rhythm were normal [Heart Sounds] : normal S1 and S2 [Murmurs] : no murmurs present [Respiration, Rhythm And Depth] : normal respiratory rhythm and effort [Exaggerated Use Of Accessory Muscles For Inspiration] : no accessory muscle use [Auscultation Breath Sounds / Voice Sounds] : lungs were clear to auscultation bilaterally [Abdomen Soft] : soft [Abdomen Tenderness] : non-tender [Abdomen Mass (___ Cm)] : no abdominal mass palpated [Nail Clubbing] : no clubbing of the fingernails [Cyanosis, Localized] : no localized cyanosis [Petechial Hemorrhages (___cm)] : no petechial hemorrhages [] : no ischemic changes [Well Developed] : well developed [Well Nourished] : well nourished [No Acute Distress] : no acute distress [Normal Conjunctiva] : normal conjunctiva [Normal Venous Pressure] : normal venous pressure [No Carotid Bruit] : no carotid bruit [Normal S1, S2] : normal S1, S2 [No Murmur] : no murmur [No Rub] : no rub [No Gallop] : no gallop [Clear Lung Fields] : clear lung fields [Good Air Entry] : good air entry [No Respiratory Distress] : no respiratory distress  [Soft] : abdomen soft [Non Tender] : non-tender [No Masses/organomegaly] : no masses/organomegaly [Normal Bowel Sounds] : normal bowel sounds [Normal Gait] : normal gait [No Edema] : no edema [No Cyanosis] : no cyanosis [No Clubbing] : no clubbing [No Varicosities] : no varicosities [No Rash] : no rash [No Skin Lesions] : no skin lesions [Moves all extremities] : moves all extremities [No Focal Deficits] : no focal deficits [Normal Speech] : normal speech [Alert and Oriented] : alert and oriented [Normal memory] : normal memory

## 2021-10-21 NOTE — ASSESSMENT
[FreeTextEntry1] : 61-year-old man with past medical history of hyperlipidemia here for follow-up\par \par \par CHEST PAIN \par -Resolved\par -minimal luminal irregularities on cath 4/2021\par -normal LVEF on LV gram and nuc \par -no echo yet\par -continue aspirin 81mg daily \par -cont  rosuvastatin 20 mg daily \par \par HTN\par -well controlled off meds\par -recc healthy diet and exercise\par \par HLD\par -LDL 73 on 4/9/2021\par -advised to follow up with PMD for repeat lipids and CMP\par -continue rosuvastatin 20 mg daily \par \par \par f/u in 4 months or sooner if any symptoms \par \par \par

## 2021-10-21 NOTE — HISTORY OF PRESENT ILLNESS
[FreeTextEntry1] : PMD: Dr. Lombardi\par \par 60-year-old man with past medical history of hyperlipidemia,  here for follow-up.\par The patient reports MYRICK and episodes of chest pain since his diagnosis of COVID-19. \par Reports feeling well today,  denies CP/MYRICK/palp/syncope/presyncope/LE edema/orthopnea\par Reports unlimited ET\par Reports med compliance.\par \par \par PMH\par HLD\par \par PSH\par hernia \par \par ALL\par NKDA\par \par MEDS\par rosuvastatin 20 mg daily  (not taking for two months, self d/janelle )\par ASA 81mg daily\par \par FH\par no significant cardiac history\par \par SH\par no smoke, no etoh, no drugs\par \par \par Nuclear stress test in 3/26/2021\par There is a medium-sized reversible perfusion defect of moderate intensity involving the mid apical inferior and inferolateral walls. In addition there is a small sized reversible perfusion defect of mild to moderate intensity involving the basal-mid anterior anteroseptal myocardial wall\par stress EKG is abnormal \par normal LV function \par \par \par EKG 3/25/2021\par SR, diffuse TWI \par \par CATH 4/9/2021\par RCA= normal\par LM= normal \par LAD= luminal irregularities\par Lcx= luminal irregularities\par OM1=large vessel, normal\par LVEF: 65%\par \par \par \par

## 2022-01-11 ENCOUNTER — APPOINTMENT (OUTPATIENT)
Dept: PULMONOLOGY | Facility: CLINIC | Age: 62
End: 2022-01-11

## 2022-02-21 NOTE — H&P ADULT - ASSESSMENT
59 y/o Male, former smoker, with FamHx of CVA (maternal uncle) and PMHx of HLD and recent Covid (hospitalized for 6 days in mid January 2021), who in light of risk factors, CCS class III symptoms, and abnormal NST, pt now presents to [facility] for recommended cardiac cath with possible intervention if clinically indicated.     ASA II, Mallampati II    Hgb/HCT: 14.3/43.8. Pt denies any recent bleeding, melena, BRBPR, hematuria. Pt was loaded with Aspirin 325mg PO x 1 and Plavix 600mg PO x 1 prior to cath per Dr. Romero.   NS @ 100 cc/hr ordered. EF normal, Cr. 1.15. Pt is euvolemic on exam, able to lie flat comfortably.     Pt is a candidate for moderate sedation: Yes    Risks & benefits of procedure and alternative therapy have been explained to the patient including but not limited to: allergic reaction, bleeding w/possible need for blood transfusion, infection, renal and vascular compromise, limb damage, arrhythmia, stroke, vessel dissection/perforation, Myocardial infarction, emergent CABG. Informed consent obtained and in chart.   Calcipotriene Counseling:  I discussed with the patient the risks of calcipotriene including but not limited to erythema, scaling, itching, and irritation.

## 2022-05-10 ENCOUNTER — APPOINTMENT (OUTPATIENT)
Dept: HEART AND VASCULAR | Facility: CLINIC | Age: 62
End: 2022-05-10
Payer: COMMERCIAL

## 2022-05-10 ENCOUNTER — NON-APPOINTMENT (OUTPATIENT)
Age: 62
End: 2022-05-10

## 2022-05-10 VITALS
BODY MASS INDEX: 28.77 KG/M2 | WEIGHT: 201 LBS | TEMPERATURE: 98.1 F | HEART RATE: 70 BPM | SYSTOLIC BLOOD PRESSURE: 143 MMHG | HEIGHT: 70 IN | DIASTOLIC BLOOD PRESSURE: 83 MMHG

## 2022-05-10 VITALS — SYSTOLIC BLOOD PRESSURE: 110 MMHG | DIASTOLIC BLOOD PRESSURE: 80 MMHG

## 2022-05-10 PROCEDURE — 99214 OFFICE O/P EST MOD 30 MIN: CPT

## 2022-05-10 NOTE — HISTORY OF PRESENT ILLNESS
[FreeTextEntry1] : PMD: Dr. Rodriges\par \par 61-year-old man with past medical history of hyperlipidemia,  here for follow-up.\par The patient reports episode of dyspnea on exertion since his diagnosis of COVID-19 last year.\par He otherwise feels fine with excellent unlimited exercise tolerance.\par Denies chest pain, palpitations, syncope, presyncope.\par Denies lower extremity edema and orthopnea.\par \par \par PMH\par HLD\par \par PSH\par hernia \par \par ALL\par NKDA\par \par MEDS\par rosuvastatin 20 mg daily  \par ASA 81mg daily\par \par FH\par no significant cardiac history\par \par SH\par no smoke, no etoh, no drugs\par \par \par Nuclear stress test in 3/26/2021\par There is a medium-sized reversible perfusion defect of moderate intensity involving the mid apical inferior and inferolateral walls. In addition there is a small sized reversible perfusion defect of mild to moderate intensity involving the basal-mid anterior anteroseptal myocardial wall\par stress EKG is abnormal \par normal LV function \par \par \par EKG 3/25/2021\par SR, diffuse TWI\par \par EKG 5/10/2022\par Sinus rhythm, heart rate 55, within normal limits\par \par CATH 4/9/2021\par RCA= normal\par LM= normal \par LAD= luminal irregularities\par Lcx= luminal irregularities\par OM1=large vessel, normal\par LVEF: 65%\par \par labs 4/5/2022\par chol 178\par trig 203\par ldl 100\par hdl 43\par creat 0.98\par ast 25\par alt 22\par \par

## 2022-05-10 NOTE — PHYSICAL EXAM
[Well Developed] : well developed [Well Nourished] : well nourished [No Acute Distress] : no acute distress [Normal Venous Pressure] : normal venous pressure [No Carotid Bruit] : no carotid bruit [Normal S1, S2] : normal S1, S2 [No Murmur] : no murmur [No Rub] : no rub [No Gallop] : no gallop [Clear Lung Fields] : clear lung fields [Good Air Entry] : good air entry [No Respiratory Distress] : no respiratory distress  [Soft] : abdomen soft [Non Tender] : non-tender [No Masses/organomegaly] : no masses/organomegaly [Normal Bowel Sounds] : normal bowel sounds [Normal Gait] : normal gait [No Edema] : no edema [No Cyanosis] : no cyanosis [No Clubbing] : no clubbing [No Varicosities] : no varicosities [No Rash] : no rash [No Skin Lesions] : no skin lesions [Moves all extremities] : moves all extremities [No Focal Deficits] : no focal deficits [Normal Speech] : normal speech [Alert and Oriented] : alert and oriented [Normal memory] : normal memory [General Appearance - Well Developed] : well developed [Normal Appearance] : normal appearance [Well Groomed] : well groomed [General Appearance - Well Nourished] : well nourished [No Deformities] : no deformities [General Appearance - In No Acute Distress] : no acute distress [Normal Conjunctiva] : the conjunctiva exhibited no abnormalities [Eyelids - No Xanthelasma] : the eyelids demonstrated no xanthelasmas [Normal Oral Mucosa] : normal oral mucosa [No Oral Pallor] : no oral pallor [No Oral Cyanosis] : no oral cyanosis [Normal Jugular Venous A Waves Present] : normal jugular venous A waves present [Normal Jugular Venous V Waves Present] : normal jugular venous V waves present [No Jugular Venous Hough A Waves] : no jugular venous hough A waves [Heart Rate And Rhythm] : heart rate and rhythm were normal [Heart Sounds] : normal S1 and S2 [Murmurs] : no murmurs present [Respiration, Rhythm And Depth] : normal respiratory rhythm and effort [Exaggerated Use Of Accessory Muscles For Inspiration] : no accessory muscle use [Auscultation Breath Sounds / Voice Sounds] : lungs were clear to auscultation bilaterally [Abdomen Soft] : soft [Abdomen Tenderness] : non-tender [Abdomen Mass (___ Cm)] : no abdominal mass palpated [Nail Clubbing] : no clubbing of the fingernails [Cyanosis, Localized] : no localized cyanosis [Petechial Hemorrhages (___cm)] : no petechial hemorrhages [] : no ischemic changes

## 2022-05-10 NOTE — ASSESSMENT
[FreeTextEntry1] : 61-year-old man with past medical history of hyperlipidemia here for follow-up\par \par \par CHEST PAIN \par -Resolved\par -minimal luminal irregularities on cath 4/2021\par -continue aspirin 81mg daily \par -cont  rosuvastatin 20 mg daily \par \par SHORTNESS OF BREATH\par -In the setting of symptoms post COVID will obtain an echocardiogram to rule out any wall motion abnormalities\par \par HTN\par -well controlled off meds\par -recc healthy diet and exercise\par \par HLD\par - on 4/5/2022\par -continue rosuvastatin 20 mg daily \par \par \par f/u post echo \par \par \par

## 2023-12-07 NOTE — HISTORY OF PRESENT ILLNESS
Occupational Therapy Evaluation/Treatment Attempt    Chart reviewed. Attempted Occupational Therapy Evaluation/Treatment, however, patient unable to be seen due to:  []  Nausea/vomiting  []  Eating  []  Pain  [x]  Patient too lethargic  []  Off Unit for testing/procedure  []  Dialysis treatment in progress   []  Telemetry Results  []  Other:     Will follow up later as patient's schedule allows.    Thank you for this referral.  Rosales Alfaro OTMICHI, OTR/L [FreeTextEntry1] : PMD; Dr. Lombardi\par \par 60-year-old man with past medical history of hyperlipidemia,  here for first evaluation with his provider.\par The patient was recently diagnosed with COVID- 19 pneumonia in 1/15/2021\par The patient follows up with Dr. Lombardi  (PMD on [facility])\par The patient reports MYRICK and episodes of chest pain since his diagnosis of COVID-19. . 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 Substernal, non radiating. Pleuritic \par Denies syncope, presyncope, LE edema, orthopnea. \par \par PMH\par HLD\par \par PSH\par hernia \par \par ALL\par NKDA\par \par MEDS\par rosuvastatin 20 mg daily  (not taking for two months, self d/janelle )\par \par FH\par no significant cardiac history\par \par \par SH\par no smoke, no etoh, no drugs\par \par \par EKG 3/25/2021\par SR, diffuse TWI \par \par \par \par

## 2024-01-12 NOTE — REVIEW OF SYSTEMS
"Justo Whitehead is a 43 y.o. male on day 3 of admission presenting with Hypertensive urgency.    Subjective   Assessment deferred, no reports of chest pain or pressure, palpitations or feeling rapid heart rate.  Remains in coronavirus isolation.  Has returned to a mildly rapid atrial fibrillation this morning.       Objective     Physical Exam assessment deferred, coronavirus isolation    Last Recorded Vitals  Blood pressure (!) 137/111, pulse 97, temperature 35.6 °C (96.1 °F), temperature source Temporal, resp. rate 20, height 1.803 m (5' 11\"), weight 118 kg (260 lb 2.3 oz), SpO2 94 %.  Intake/Output last 3 Shifts:  I/O last 3 completed shifts:  In: 94.3 (0.8 mL/kg) [I.V.:94.3 (0.8 mL/kg)]  Out: - (0 mL/kg)   Weight: 118 kg     Relevant Results  Results for orders placed or performed during the hospital encounter of 01/08/24 (from the past 24 hour(s))   Basic Metabolic Panel   Result Value Ref Range    Glucose 105 (H) 65 - 99 mg/dL    Sodium 140 133 - 145 mmol/L    Potassium 4.0 3.4 - 5.1 mmol/L    Chloride 103 97 - 107 mmol/L    Bicarbonate 26 24 - 31 mmol/L    Urea Nitrogen 10 8 - 25 mg/dL    Creatinine 0.80 0.40 - 1.60 mg/dL    eGFR >90 >60 mL/min/1.73m*2    Calcium 8.8 8.5 - 10.4 mg/dL    Anion Gap 11 <=19 mmol/L   CBC   Result Value Ref Range    WBC 5.0 4.4 - 11.3 x10*3/uL    nRBC 0.0 0.0 - 0.0 /100 WBCs    RBC 3.80 (L) 4.50 - 5.90 x10*6/uL    Hemoglobin 12.1 (L) 13.5 - 17.5 g/dL    Hematocrit 36.0 (L) 41.0 - 52.0 %    MCV 95 80 - 100 fL    MCH 31.8 26.0 - 34.0 pg    MCHC 33.6 32.0 - 36.0 g/dL    RDW 14.1 11.5 - 14.5 %    Platelets 120 (L) 150 - 450 x10*3/uL         Assessment/Plan   Principal Problem:    Hypertensive urgency  Active Problems:    Hypertension, unspecified type    New onset atrial fibrillation with rapid ventricular response  Dyspnea on Exertion  Covid-19 Infection  Hypertension  Alcohol Abuse     Impression and Plan: Patient presented the emergency department with worsening shortness of " breath over the last week.  Patient reports shortness of breath with exertion as well as even while resting in bed.  Denies chest pain, pressure.  Reports occasional palpitations.  Patient presented to the emergency department was found to be in a rapid atrial fibrillation with a ventricular rate of 153.  Patient was also found to be COVID-positive.  Troponin levels were elevated but flat at 28, 25 and 25.  Patient does have a history of alcohol abuse and states that he has been doing a home detox since last Wednesday. On exam patient is alert and oriented.  He is breathing comfortably on room air with a pulse oximetry of 98%.  He remains in a rapid atrial fibrillation on telemetry with rates in the 120s.  Appears overall euvolemic to examination. His diltiazem drip was discontinued last evening due to hypotension.  An echocardiogram has been ordered to be completed this morning. I have ordered a one time push of IV digoxin 125 mcg to be administered for his rapid atrial fibrillation.  Additionally this patient's [id number] score is rather low at 1 and I do not believe the initiation of oral anticoagulation is necessary at this time.  Would suspect patient's new onset atrial fibrillation with rapid ventricular response may be the result of alcohol withdrawal. Will continue to follow.      1/10: Reports feeling much better this morning.  He states he was able to get some sleep last night.  Denies chest pain, pressure or palpitations.  States his shortness of breath is improved.  Patient was placed on a Cardizem drip last night which is still running at 5 mg/h.  He remains in atrial fibrillation on telemetry but is rate controlled at this time.  Blood pressure is normal overall with his last recorded at 126/92.  Breathing comfortably on room air with pulse oximetry of 95%.  Patient appears overall euvolemic on examination.  This morning shows sodium 139, potassium 3.2, creatinine 0.80.  Potassium replacement has  been ordered for this morning.  Echocardiogram completed yesterday showed a mildly decreased ejection fraction of 40 to 45% as well as global hypokinesis of the left ventricle with minor regional variations.  Continue patient on 20 mg of lisinopril daily for blood pressure.  Carvedilol has been increased to 12.5 mg twice daily by mouth which I am agreeable to.  Will continue to monitor telemetry but suspect that patient can be taken off the Cardizem drip at some point today.  Will continue to follow.     1/11: No significant changes over the past 24 hours.  No reports of palpitations, or chest pain.  Respiratory status continues to improve.  He continued on diltiazem drip overnight, this morning he remains in a rate controlled atrial fibrillation, will stop diltiazem drip.  Blood pressure is improving, most recent 122/99.  Exam was increased yesterday to 12.5 twice daily.  Would continue with this current dose.  Lisinopril now 20 mg daily.  Updated [id number] equals 2 noting reduced ejection fraction.  Anticoagulation is generally recommended, however the patient is noted to be an alcoholic who is attempting detox.  I do have concern for long-term anticoagulation in this patient.  Will discuss with Dr. Josue the plan going forward for anticoagulation.  Perhaps a reasonable alternative would be a aspirin 81 mg daily.  Will follow with you.    1/12: Generally stable over the chest 24 hours, this morning patient has returned to rapid atrial fibrillation with heart rate predominantly to 110 and 120.  His carvedilol has been increased further to 25 mg p.o. twice daily.  He remains with adequate blood pressure with most recent 137/111.  Room air pulse ox is noted to be 97%.  No reports of chest pain or pressure, palpitations or feeling rapid heart rate.  Creatinine is unchanged at 0.8.  At this point we will use IV digoxin for further rate control and initiate the patient on oral digoxin at 125 mg daily.  This will be  reevaluated at time of follow-up in the office.  From a cardiac perspective if his rate remains under 120 and predominantly under 110 he will be considered stable for discharge.  He has been initiated on anticoagulation with apixaban.  Will be provided 1 month of free apixaban from the hospital at time of discharge, there is some concern about the affordability this medication going forward.  Will reassess this at time of follow-up in the office in the next 2 to 3 weeks.  If he remains in atrial fibrillation in the outpatient setting he will be plan for an outpatient cardioversion.      I spent 35 minutes in the professional and overall care of this patient.      Hernán Morrison, MATEO-CNP       [Cough] : cough [Hemoptysis] : no hemoptysis [Chest Tightness] : no chest tightness [Frequent URIs] : no frequent URIs [Sputum] : no sputum [Dyspnea] : dyspnea [Pleuritic Pain] : pleuritic pain [Wheezing] : wheezing [A.M. Dry Mouth] : a.m. dry mouth [SOB on Exertion] : sob on exertion [Negative] : Endocrine